# Patient Record
Sex: FEMALE | Race: WHITE | URBAN - METROPOLITAN AREA
[De-identification: names, ages, dates, MRNs, and addresses within clinical notes are randomized per-mention and may not be internally consistent; named-entity substitution may affect disease eponyms.]

---

## 2016-09-02 LAB
C TRACH DNA SPEC QL NAA+PROBE: NEGATIVE
N GONORRHOEA DNA SPEC QL NAA+PROBE: NEGATIVE

## 2017-05-12 ENCOUNTER — HOSPITAL ENCOUNTER (OUTPATIENT)
Dept: LAB | Facility: MEDICAL CENTER | Age: 27
End: 2017-05-12
Attending: OBSTETRICS & GYNECOLOGY
Payer: MEDICAID

## 2017-05-12 ENCOUNTER — HOSPITAL ENCOUNTER (OUTPATIENT)
Facility: MEDICAL CENTER | Age: 27
End: 2017-05-12
Attending: OBSTETRICS & GYNECOLOGY
Payer: MEDICAID

## 2017-05-12 ENCOUNTER — GYNECOLOGY VISIT (OUTPATIENT)
Dept: OBGYN | Facility: CLINIC | Age: 27
End: 2017-05-12
Payer: MEDICAID

## 2017-05-12 VITALS
BODY MASS INDEX: 29.02 KG/M2 | SYSTOLIC BLOOD PRESSURE: 122 MMHG | WEIGHT: 170 LBS | HEIGHT: 64 IN | DIASTOLIC BLOOD PRESSURE: 72 MMHG

## 2017-05-12 DIAGNOSIS — Z11.51 SCREENING FOR HPV (HUMAN PAPILLOMAVIRUS): ICD-10-CM

## 2017-05-12 DIAGNOSIS — N92.0 MENORRHAGIA WITH REGULAR CYCLE: ICD-10-CM

## 2017-05-12 DIAGNOSIS — Z12.4 SCREENING FOR MALIGNANT NEOPLASM OF CERVIX: ICD-10-CM

## 2017-05-12 DIAGNOSIS — Z30.09 STERILIZATION CONSULT: ICD-10-CM

## 2017-05-12 LAB
BASOPHILS # BLD AUTO: 0.9 % (ref 0–1.8)
BASOPHILS # BLD: 0.05 K/UL (ref 0–0.12)
CYTOLOGY REG CYTOL: NORMAL
EOSINOPHIL # BLD AUTO: 0.08 K/UL (ref 0–0.51)
EOSINOPHIL NFR BLD: 1.4 % (ref 0–6.9)
ERYTHROCYTE [DISTWIDTH] IN BLOOD BY AUTOMATED COUNT: 38.9 FL (ref 35.9–50)
HCT VFR BLD AUTO: 35.4 % (ref 37–47)
HGB BLD-MCNC: 11.1 G/DL (ref 12–16)
IMM GRANULOCYTES # BLD AUTO: 0.02 K/UL (ref 0–0.11)
IMM GRANULOCYTES NFR BLD AUTO: 0.3 % (ref 0–0.9)
LYMPHOCYTES # BLD AUTO: 2.36 K/UL (ref 1–4.8)
LYMPHOCYTES NFR BLD: 40.6 % (ref 22–41)
MCH RBC QN AUTO: 22.8 PG (ref 27–33)
MCHC RBC AUTO-ENTMCNC: 31.4 G/DL (ref 33.6–35)
MCV RBC AUTO: 72.8 FL (ref 81.4–97.8)
MONOCYTES # BLD AUTO: 0.34 K/UL (ref 0–0.85)
MONOCYTES NFR BLD AUTO: 5.9 % (ref 0–13.4)
NEUTROPHILS # BLD AUTO: 2.96 K/UL (ref 2–7.15)
NEUTROPHILS NFR BLD: 50.9 % (ref 44–72)
NRBC # BLD AUTO: 0 K/UL
NRBC BLD AUTO-RTO: 0 /100 WBC
PLATELET # BLD AUTO: 267 K/UL (ref 164–446)
PMV BLD AUTO: 10.8 FL (ref 9–12.9)
RBC # BLD AUTO: 4.86 M/UL (ref 4.2–5.4)
WBC # BLD AUTO: 5.8 K/UL (ref 4.8–10.8)

## 2017-05-12 PROCEDURE — 85025 COMPLETE CBC W/AUTO DIFF WBC: CPT

## 2017-05-12 PROCEDURE — 99203 OFFICE O/P NEW LOW 30 MIN: CPT | Performed by: OBSTETRICS & GYNECOLOGY

## 2017-05-12 PROCEDURE — 36415 COLL VENOUS BLD VENIPUNCTURE: CPT

## 2017-05-12 NOTE — PROGRESS NOTES
" Johnny Mac26 y.o.  female presents for Annual Well Woman Exam.   Patient is currently without complaints. She reports regular monthly menstrual cycles. She denies breakthrough bleeding or spotting. She denies any excessive pain associated with her menses. She also denies pelvic pain or pressure when she is not on her menses.  Patient states that her periods are heavy and somewhat painful. But this is normal for her. At this time the patient desires permanent sterilization. She states that she would prefer to just have a hysterectomy but would be fine and having her tubes tied.    ROS: Patient is feeling well. No dyspnea or chest pain on exertion. No Abdominal pain, change in bowel habits, black or bloody stools. No urinary sx. GYN ROS:normal menses, no abnormal bleeding, pelvic pain or discharge, no breast pain or new or enlarging lumps on self exam, no side effects of hormonal medications, no discharge or pelvic pain. Denies breast tenderness, mass, discharge, changes in size or contour, or abnormal cyclic discomfort. No neurological complaints.  Past Medical History   Diagnosis Date   • Anemia    • Asthma    • Blood transfusion without reported diagnosis         • Migraine      None  Allergy:   Review of patient's allergies indicates no known allergies.    LMP:       Patient's last menstrual period was 2017. .     Menstrual History: menses regular every 28 days  Contraceptive Method:none    Pap history, the patient denies abnormal Pap smears and denies   sexually transmitted diseases    Mammo:too young    Objective : The patient appears well, alert and oriented x 3, in no acute distress.  Blood pressure 122/72, height 1.626 m (5' 4\"), weight 77.111 kg (170 lb), last menstrual period 2017, not currently breastfeeding.  HEENT Exam: EOMI, [place], no adenopathy or thyromegaly.  Lungs: Clear to Auscultation   Cor: S1 and S2 normal, no murmurs, or rubs   Abdomen: Soft without tenderness, " guarding mass or organomegaly.  Extremities: No edema, pulses equal  Neurological: Normal No focal signs  Breast Exam:Inspection negative. No nipple discharge or bleeding. No masses or nodularity palpable  Pelvic: External genitalia,urethral meatus, urethra, bladder and vagina normal. Cervix, uterus and adnexa intact and normal.  Anus and perineum normal. Bimanual and rectovaginal without masses or tenderness., negative findings: external genitalia normal, Bartholin's glands, urethra, Pence's glands negative, vaginal mucosa normal, cervix clear, normal sized uterus, adnexae negative    Lab:No results found for this or any previous visit (from the past 336 hour(s)).    Assessment:  well woman    Plan:pap smear  counseled on breast self exam and family planning choices    Risks and benefits of laparoscopy are discussed with patient today. Specific risk for a laparoscopy are infection, bleeding, scar to the exterior or interior of abdomen, damage to other structures including bowel, bladder or ureters. Failure of laparoscopy to diagnose and or correct patient's problem.      Discussed today with a risks of tubal ligation. I discussed that the tubal is considered a permanent procedure and the patient will no longer be able to bear children following a tubal. I discussed other forms of birth control which include pills, barrier methods, Depo-Provera, IUD or male sterilization. We discussed  medications or procedures are nonpermanent nor are they surgical. I also discussed the risk of tubal failure with the patient which is one in 200 procedures. We discussed that should the tubal fail there is a risk for ectopic pregnancy which may require surgical intervention.      Contraception:none  Tubal consent signed today  CBC to check for anemia  Medical records from previous physician  Okay to schedule tubal after one month

## 2017-05-12 NOTE — MR AVS SNAPSHOT
"        Loanjim Bubba   2017 3:30 PM   Gynecology Visit   MRN: [id number]    Department:  [facility]   Dept Phone:  [phone]    Description:  Female : 1990   Provider:  Josiane Abraham M.D.           Reason for Visit     New Patient           Allergies as of 2017     No Known Allergies      You were diagnosed with     Screening for HPV (human papillomavirus)   [067387]       Screening for malignant neoplasm of cervix   [485844]       Menorrhagia with regular cycle   [524531]         Vital Signs     Blood Pressure Height Weight Body Mass Index Last Menstrual Period Breastfeeding?    122/72 mmHg 1.626 m (5' 4\") 77.111 kg (170 lb) 29.17 kg/m2 2017 No    Smoking Status                   Never Smoker            Basic Information     Date Of Birth Sex Race Ethnicity Preferred Language    1990 Female White Unknown English      Health Maintenance     Patient has no pending health maintenance at this time      Current Immunizations     No immunizations on file.      Below and/or attached are the medications your provider expects you to take. Review all of your home medications and newly ordered medications with your provider and/or pharmacist. Follow medication instructions as directed by your provider and/or pharmacist. Please keep your medication list with you and share with your provider. Update the information when medications are discontinued, doses are changed, or new medications (including over-the-counter products) are added; and carry medication information at all times in the event of emergency situations     Allergies:  No Known Allergies          Medications  Valid as of: May 12, 2017 -  4:22 PM    Generic Name Brand Name Tablet Size Instructions for use    Albuterol Sulfate   Inhale  by mouth.        Beclomethasone Dipropionate (Aero Soln) QVAR 40 MCG/ACT Inhale 1 Puff by mouth 2 Times a Day.        Ibuprofen (Tab) Ibuprofen 100 MG Take  by mouth.        .          "      Medicines prescribed today were sent to:     [facility] - [place], [place] - [address]    [address] [facility] [place] [address]    Phone: [phone] Fax: [phone]    Open 24 Hours?: No      Medication refill instructions:       If your prescription bottle indicates you have medication refills left, it is not necessary to call your provider’s office. Please contact your pharmacy and they will refill your medication.    If your prescription bottle indicates you do not have any refills left, you may request refills at any time through one of the following ways: The online [employer] system (except Urgent Care), by calling your provider’s office, or by asking your pharmacy to contact your provider’s office with a refill request. Medication refills are processed only during regular business hours and may not be available until the next business day. Your provider may request additional information or to have a follow-up visit with you prior to refilling your medication.   *Please Note: Medication refills are assigned a new Rx number when refilled electronically. Your pharmacy may indicate that no refills were authorized even though a new prescription for the same medication is available at the pharmacy. Please request the medicine by name with the pharmacy before contacting your provider for a refill.        Your To Do List     Future Labs/Procedures Complete By Rupali KUO WITH DIFFERENTIAL  As directed 5/12/2018         [employer] Access Code: Activation code not generated  Current [employer] Status: Active

## 2017-05-26 LAB
ABO GROUP BLD: NORMAL
ABO GROUP BLD: NORMAL
BLD GP AB SCN SERPL QL: NEGATIVE
C TRACH DNA SPEC QL NAA+PROBE: NEGATIVE
GP B STREP DNA SPEC QL NAA+PROBE: NEGATIVE
GP B STREP DNA SPEC QL NAA+PROBE: NEGATIVE
HBV SURFACE AG SERPL QL IA: NEGATIVE
HCT VFR BLD AUTO: 32.8 %
HCT VFR BLD AUTO: 32.8 %
HGB BLD-MCNC: 10.3 G/DL
HGB BLD-MCNC: 10.3 G/DL
HIV 1 0 2 IC ZHVIC: NEGATIVE
HIV 1 0 2 IC ZHVIC: NEGATIVE
N GONORRHOEA DNA SPEC QL NAA+PROBE: NEGATIVE
RH BLD: POSITIVE
RUBV IGG SERPL IA-ACNC: NORMAL
TSI ACT/NOR SER: 1.61
TSI ACT/NOR SER: 1.61